# Patient Record
Sex: MALE | Race: WHITE | NOT HISPANIC OR LATINO
[De-identification: names, ages, dates, MRNs, and addresses within clinical notes are randomized per-mention and may not be internally consistent; named-entity substitution may affect disease eponyms.]

---

## 2021-09-05 DIAGNOSIS — M25.50 PAIN IN UNSPECIFIED JOINT: ICD-10-CM

## 2021-09-05 DIAGNOSIS — Z80.8 FAMILY HISTORY OF MALIGNANT NEOPLASM OF OTHER ORGANS OR SYSTEMS: ICD-10-CM

## 2021-09-05 DIAGNOSIS — Z82.61 FAMILY HISTORY OF ARTHRITIS: ICD-10-CM

## 2021-09-10 ENCOUNTER — NON-APPOINTMENT (OUTPATIENT)
Age: 51
End: 2021-09-10

## 2021-09-10 ENCOUNTER — APPOINTMENT (OUTPATIENT)
Dept: INTERNAL MEDICINE | Facility: CLINIC | Age: 51
End: 2021-09-10
Payer: COMMERCIAL

## 2021-09-10 VITALS
WEIGHT: 265 LBS | SYSTOLIC BLOOD PRESSURE: 136 MMHG | DIASTOLIC BLOOD PRESSURE: 82 MMHG | RESPIRATION RATE: 17 BRPM | TEMPERATURE: 97.9 F | OXYGEN SATURATION: 95 % | HEART RATE: 104 BPM | BODY MASS INDEX: 37.1 KG/M2 | HEIGHT: 71 IN

## 2021-09-10 DIAGNOSIS — R79.89 OTHER SPECIFIED ABNORMAL FINDINGS OF BLOOD CHEMISTRY: ICD-10-CM

## 2021-09-10 PROCEDURE — 99214 OFFICE O/P EST MOD 30 MIN: CPT | Mod: 25

## 2021-09-14 PROBLEM — R79.89 LOW VITAMIN D LEVEL: Status: ACTIVE | Noted: 2021-09-05

## 2021-09-15 LAB
25(OH)D3 SERPL-MCNC: 30.4 NG/ML
BASOPHILS # BLD AUTO: 0.08 K/UL
BASOPHILS NFR BLD AUTO: 1.3 %
EOSINOPHIL # BLD AUTO: 0.17 K/UL
EOSINOPHIL NFR BLD AUTO: 2.8 %
ESTIMATED AVERAGE GLUCOSE: 120 MG/DL
HBA1C MFR BLD HPLC: 5.8 %
HCT VFR BLD CALC: 51 %
HGB BLD-MCNC: 15.7 G/DL
IMM GRANULOCYTES NFR BLD AUTO: 0.2 %
LYMPHOCYTES # BLD AUTO: 1.43 K/UL
LYMPHOCYTES NFR BLD AUTO: 23.3 %
MAN DIFF?: NORMAL
MCHC RBC-ENTMCNC: 28.5 PG
MCHC RBC-ENTMCNC: 30.8 GM/DL
MCV RBC AUTO: 92.6 FL
MONOCYTES # BLD AUTO: 0.43 K/UL
MONOCYTES NFR BLD AUTO: 7 %
NEUTROPHILS # BLD AUTO: 4.03 K/UL
NEUTROPHILS NFR BLD AUTO: 65.4 %
PLATELET # BLD AUTO: 314 K/UL
RBC # BLD: 5.51 M/UL
RBC # FLD: 13.6 %
WBC # FLD AUTO: 6.15 K/UL

## 2021-09-15 NOTE — HISTORY OF PRESENT ILLNESS
[FreeTextEntry1] : follow up [de-identified] : 50 y/o male presents for follow up. He states he had covid vaccine a few weeks ago. He is  concerned about a few labs tests and is  requesting to repeat his CBC, Vit D and A1C. Patient also states he is going for a colonoscopy next month due to FH of father with colon cancer. He feels otherwise well and offers no other acute complaints or concerns.

## 2021-10-18 ENCOUNTER — APPOINTMENT (OUTPATIENT)
Dept: DISASTER EMERGENCY | Facility: CLINIC | Age: 51
End: 2021-10-18

## 2021-10-18 DIAGNOSIS — Z01.818 ENCOUNTER FOR OTHER PREPROCEDURAL EXAMINATION: ICD-10-CM

## 2021-10-19 LAB — SARS-COV-2 N GENE NPH QL NAA+PROBE: NOT DETECTED

## 2021-10-21 ENCOUNTER — APPOINTMENT (OUTPATIENT)
Dept: SURGERY | Facility: CLINIC | Age: 51
End: 2021-10-21
Payer: COMMERCIAL

## 2021-10-21 PROCEDURE — 45380 COLONOSCOPY AND BIOPSY: CPT

## 2021-11-04 ENCOUNTER — TRANSCRIPTION ENCOUNTER (OUTPATIENT)
Age: 51
End: 2021-11-04

## 2021-11-04 LAB — CORE LAB BIOPSY: NORMAL

## 2021-12-29 ENCOUNTER — APPOINTMENT (OUTPATIENT)
Dept: INTERNAL MEDICINE | Facility: CLINIC | Age: 51
End: 2021-12-29
Payer: COMMERCIAL

## 2021-12-29 DIAGNOSIS — U07.1 COVID-19: ICD-10-CM

## 2021-12-29 PROCEDURE — 87804 INFLUENZA ASSAY W/OPTIC: CPT | Mod: 59,QW

## 2021-12-29 PROCEDURE — 99213 OFFICE O/P EST LOW 20 MIN: CPT | Mod: 25

## 2021-12-30 LAB
FLUAV SPEC QL CULT: NEGATIVE
FLUBV AG SPEC QL IA: NEGATIVE

## 2021-12-31 PROBLEM — U07.1 COVID-19: Status: ACTIVE | Noted: 2021-12-29

## 2021-12-31 NOTE — HISTORY OF PRESENT ILLNESS
[Patient presents to the office today for COVID-19 evaluation and testing.] : Patient presents to the office today for COVID-19 evaluation and testing. [] : no dizziness on standing [None Known] : none known [FreeTextEntry1] : Patient developed chills and cough and nasal congestion.  Denies shortness of breath [Lung Disease] : lung disease [None] : none [Clear] : clear [Normal O2 sat at rest] : normal O2 sat at rest [Grossly normal, interacts, not tired or weak] : grossly normal, interacts, not tired or weak [COVID-19 testing ordered and specimen obtained] : COVID-19 testing ordered and specimen obtained

## 2022-01-03 ENCOUNTER — NON-APPOINTMENT (OUTPATIENT)
Age: 52
End: 2022-01-03

## 2022-01-03 LAB
RAPID RVP RESULT: DETECTED
SARS-COV-2 RNA PNL RESP NAA+PROBE: DETECTED

## 2022-01-03 NOTE — HISTORY OF PRESENT ILLNESS
[Home] : at home, [unfilled] , at the time of the visit. [Medical Office: ([facility])___] : at the medical office located in  [Verbal consent obtained from patient] : the patient, [unfilled] [FreeTextEntry1] : informed covid +// asymptomatic

## 2022-01-04 ENCOUNTER — TRANSCRIPTION ENCOUNTER (OUTPATIENT)
Age: 52
End: 2022-01-04

## 2022-07-30 ENCOUNTER — APPOINTMENT (OUTPATIENT)
Dept: INTERNAL MEDICINE | Facility: CLINIC | Age: 52
End: 2022-07-30

## 2022-07-30 VITALS
BODY MASS INDEX: 37.1 KG/M2 | TEMPERATURE: 97.8 F | OXYGEN SATURATION: 96 % | HEART RATE: 63 BPM | WEIGHT: 265 LBS | RESPIRATION RATE: 17 BRPM | SYSTOLIC BLOOD PRESSURE: 130 MMHG | HEIGHT: 71 IN | DIASTOLIC BLOOD PRESSURE: 80 MMHG

## 2022-07-30 DIAGNOSIS — Z86.39 PERSONAL HISTORY OF OTHER ENDOCRINE, NUTRITIONAL AND METABOLIC DISEASE: ICD-10-CM

## 2022-07-30 DIAGNOSIS — Z87.09 PERSONAL HISTORY OF OTHER DISEASES OF THE RESPIRATORY SYSTEM: ICD-10-CM

## 2022-07-30 DIAGNOSIS — M25.569 PAIN IN UNSPECIFIED KNEE: ICD-10-CM

## 2022-07-30 DIAGNOSIS — Z91.09 OTHER ALLERGY STATUS, OTHER THAN TO DRUGS AND BIOLOGICAL SUBSTANCES: ICD-10-CM

## 2022-07-30 LAB
GLUCOSE BLDC GLUCOMTR-MCNC: 101
HBA1C MFR BLD HPLC: 5.6

## 2022-07-30 PROCEDURE — 82962 GLUCOSE BLOOD TEST: CPT

## 2022-07-30 PROCEDURE — 99214 OFFICE O/P EST MOD 30 MIN: CPT | Mod: 25

## 2022-07-30 PROCEDURE — 36415 COLL VENOUS BLD VENIPUNCTURE: CPT

## 2022-07-30 PROCEDURE — 83036 HEMOGLOBIN GLYCOSYLATED A1C: CPT | Mod: QW

## 2022-07-30 PROCEDURE — G0447 BEHAVIOR COUNSEL OBESITY 15M: CPT

## 2022-07-30 RX ORDER — AZITHROMYCIN 250 MG/1
250 TABLET, FILM COATED ORAL
Qty: 1 | Refills: 0 | Status: DISCONTINUED | COMMUNITY
Start: 2021-12-29 | End: 2022-07-30

## 2022-07-30 RX ORDER — ALBUTEROL SULFATE 2.5 MG/3ML
(2.5 MG/3ML) SOLUTION RESPIRATORY (INHALATION)
Refills: 0 | Status: DISCONTINUED | COMMUNITY
End: 2022-07-30

## 2022-07-30 RX ORDER — MOMETASONE FUROATE MONOHYDRATE 50 UG/1
50 SPRAY, METERED NASAL
Refills: 0 | Status: DISCONTINUED | COMMUNITY
End: 2022-07-30

## 2022-07-30 RX ORDER — DIAZEPAM 5 MG/1
TABLET ORAL
Refills: 0 | Status: DISCONTINUED | COMMUNITY
End: 2022-07-30

## 2022-07-30 RX ORDER — GUAIFENESIN 100 MG/5ML
100 SOLUTION ORAL 3 TIMES DAILY
Qty: 250 | Refills: 0 | Status: DISCONTINUED | COMMUNITY
Start: 2022-01-04 | End: 2022-07-30

## 2022-07-30 RX ORDER — NAPROXEN 500 MG/1
500 TABLET ORAL
Qty: 60 | Refills: 0 | Status: DISCONTINUED | COMMUNITY
Start: 2022-02-08

## 2022-07-30 RX ORDER — ZOLPIDEM TARTRATE 5 MG/1
TABLET, FILM COATED ORAL
Refills: 0 | Status: DISCONTINUED | COMMUNITY
End: 2022-07-30

## 2022-07-30 NOTE — PHYSICAL EXAM
[Normal] : no acute distress, well nourished, well developed and well-appearing [No Respiratory Distress] : no respiratory distress  [Clear to Auscultation] : lungs were clear to auscultation bilaterally [Normal Rate] : normal rate  [Regular Rhythm] : with a regular rhythm [Normal S1, S2] : normal S1 and S2 [No Edema] : there was no peripheral edema [Soft] : abdomen soft [Non Tender] : non-tender [Non-distended] : non-distended [Normal Bowel Sounds] : normal bowel sounds

## 2022-07-30 NOTE — COUNSELING
[Potential consequences of obesity discussed] : Potential consequences of obesity discussed [Benefits of weight loss discussed] : Benefits of weight loss discussed [Encouraged to increase physical activity] : Encouraged to increase physical activity [Encouraged to maintain food diary] : Encouraged to maintain food diary [FreeTextEntry4] : 10

## 2022-07-30 NOTE — HISTORY OF PRESENT ILLNESS
[FreeTextEntry1] : Follow-up [de-identified] : Patient presents for follow-up for close intolerance hyperlipidemia obesity situational anxiety insomnia GERD.  Patient is reporting having difficult time losing weight.  Has had good diet and trying to exercise however was not able to lose weight.  Patient states that he has been having some knee Pain which prevents him from exercising regularly

## 2022-08-04 ENCOUNTER — NON-APPOINTMENT (OUTPATIENT)
Age: 52
End: 2022-08-04

## 2022-08-04 LAB
ALBUMIN SERPL ELPH-MCNC: 4.7 G/DL
ALP BLD-CCNC: 71 U/L
ALT SERPL-CCNC: 33 U/L
AMPHET UR-MCNC: NEGATIVE
ANION GAP SERPL CALC-SCNC: 14 MMOL/L
AST SERPL-CCNC: 21 U/L
BARBITURATES UR-MCNC: NEGATIVE
BASOPHILS # BLD AUTO: 0.07 K/UL
BASOPHILS NFR BLD AUTO: 1.4 %
BENZODIAZ UR-MCNC: NEGATIVE
BILIRUB SERPL-MCNC: 0.2 MG/DL
BUN SERPL-MCNC: 11 MG/DL
CALCIUM SERPL-MCNC: 9.7 MG/DL
CHLORIDE SERPL-SCNC: 106 MMOL/L
CHOLEST SERPL-MCNC: 228 MG/DL
CO2 SERPL-SCNC: 24 MMOL/L
COCAINE METAB.OTHER UR-MCNC: NEGATIVE
CREAT SERPL-MCNC: 0.77 MG/DL
CREAT SPEC-SCNC: 169 MG/DL
CREATININE, URINE: 172.1 MG/DL
EGFR: 108 ML/MIN/1.73M2
EOSINOPHIL # BLD AUTO: 0.14 K/UL
EOSINOPHIL NFR BLD AUTO: 2.8 %
GLUCOSE SERPL-MCNC: 105 MG/DL
HCT VFR BLD CALC: 49.7 %
HDLC SERPL-MCNC: 61 MG/DL
HGB BLD-MCNC: 14.5 G/DL
IMM GRANULOCYTES NFR BLD AUTO: 0.2 %
LDLC SERPL CALC-MCNC: 147 MG/DL
LYMPHOCYTES # BLD AUTO: 1.3 K/UL
LYMPHOCYTES NFR BLD AUTO: 26.2 %
MAN DIFF?: NORMAL
MCHC RBC-ENTMCNC: 26.7 PG
MCHC RBC-ENTMCNC: 29.2 GM/DL
MCV RBC AUTO: 91.5 FL
METHADONE UR-MCNC: NEGATIVE
METHAQUALONE UR-MCNC: NEGATIVE
MICROALBUMIN 24H UR DL<=1MG/L-MCNC: <1.2 MG/DL
MICROALBUMIN/CREAT 24H UR-RTO: NORMAL MG/G
MONOCYTES # BLD AUTO: 0.43 K/UL
MONOCYTES NFR BLD AUTO: 8.7 %
NEUTROPHILS # BLD AUTO: 3.02 K/UL
NEUTROPHILS NFR BLD AUTO: 60.7 %
NONHDLC SERPL-MCNC: 167 MG/DL
OPIATES UR-MCNC: NEGATIVE
PCP UR-MCNC: NEGATIVE
PLATELET # BLD AUTO: 301 K/UL
POTASSIUM SERPL-SCNC: 4.8 MMOL/L
PROPOXYPH UR QL: NEGATIVE
PROT SERPL-MCNC: 7.6 G/DL
RBC # BLD: 5.43 M/UL
RBC # FLD: 14 %
SODIUM SERPL-SCNC: 144 MMOL/L
THC UR QL: NEGATIVE
TRIGL SERPL-MCNC: 101 MG/DL
WBC # FLD AUTO: 4.97 K/UL

## 2022-12-15 ENCOUNTER — TRANSCRIPTION ENCOUNTER (OUTPATIENT)
Age: 52
End: 2022-12-15

## 2022-12-17 ENCOUNTER — APPOINTMENT (OUTPATIENT)
Dept: INTERNAL MEDICINE | Facility: CLINIC | Age: 52
End: 2022-12-17

## 2022-12-17 ENCOUNTER — NON-APPOINTMENT (OUTPATIENT)
Age: 52
End: 2022-12-17

## 2022-12-17 VITALS
SYSTOLIC BLOOD PRESSURE: 132 MMHG | WEIGHT: 270 LBS | OXYGEN SATURATION: 97 % | BODY MASS INDEX: 37.8 KG/M2 | DIASTOLIC BLOOD PRESSURE: 84 MMHG | HEIGHT: 71 IN | RESPIRATION RATE: 17 BRPM | HEART RATE: 92 BPM | TEMPERATURE: 97.7 F

## 2022-12-17 DIAGNOSIS — Z00.00 ENCOUNTER FOR GENERAL ADULT MEDICAL EXAMINATION W/OUT ABNORMAL FINDINGS: ICD-10-CM

## 2022-12-17 DIAGNOSIS — G47.00 INSOMNIA, UNSPECIFIED: ICD-10-CM

## 2022-12-17 PROCEDURE — 99214 OFFICE O/P EST MOD 30 MIN: CPT | Mod: 25

## 2022-12-17 PROCEDURE — 93000 ELECTROCARDIOGRAM COMPLETE: CPT | Mod: 59

## 2022-12-17 PROCEDURE — G0444 DEPRESSION SCREEN ANNUAL: CPT | Mod: 59

## 2022-12-17 PROCEDURE — 99396 PREV VISIT EST AGE 40-64: CPT | Mod: 25

## 2022-12-17 RX ORDER — SEMAGLUTIDE 1.34 MG/ML
2 INJECTION, SOLUTION SUBCUTANEOUS
Qty: 1 | Refills: 5 | Status: DISCONTINUED | COMMUNITY
Start: 2022-07-30 | End: 2022-12-17

## 2022-12-17 RX ORDER — DIAZEPAM 2 MG/1
2 TABLET ORAL
Qty: 5 | Refills: 0 | Status: DISCONTINUED | COMMUNITY
Start: 2022-07-30 | End: 2022-12-17

## 2022-12-17 RX ORDER — ZOLPIDEM TARTRATE 10 MG/1
10 TABLET ORAL
Qty: 10 | Refills: 0 | Status: DISCONTINUED | COMMUNITY
Start: 2022-07-30 | End: 2022-12-17

## 2022-12-17 RX ORDER — METFORMIN HYDROCHLORIDE 500 MG/1
500 TABLET, COATED ORAL
Qty: 180 | Refills: 0 | Status: DISCONTINUED | COMMUNITY
Start: 2022-08-04 | End: 2022-12-17

## 2022-12-17 NOTE — HEALTH RISK ASSESSMENT
[0] : 2) Feeling down, depressed, or hopeless: Not at all (0) [Patient reported colonoscopy was normal] : Patient reported colonoscopy was normal [[id number]] : 0 [ColonoscopyDate] : 2021

## 2022-12-17 NOTE — PHYSICAL EXAM
[No Acute Distress] : no acute distress [Well Nourished] : well nourished [Well Developed] : well developed [Well-Appearing] : well-appearing [Normal Sclera/Conjunctiva] : normal sclera/conjunctiva [PERRL] : pupils equal round and reactive to light [EOMI] : extraocular movements intact [Normal Outer Ear/Nose] : the outer ears and nose were normal in appearance [Normal Oropharynx] : the oropharynx was normal [No JVD] : no jugular venous distention [No Lymphadenopathy] : no lymphadenopathy [Supple] : supple [Thyroid Normal, No Nodules] : the thyroid was normal and there were no nodules present [No Respiratory Distress] : no respiratory distress  [No Accessory Muscle Use] : no accessory muscle use [Clear to Auscultation] : lungs were clear to auscultation bilaterally [Normal Rate] : normal rate  [Regular Rhythm] : with a regular rhythm [Normal S1, S2] : normal S1 and S2 [No Carotid Bruits] : no carotid bruits [No Abdominal Bruit] : a ~M bruit was not heard ~T in the abdomen [No Varicosities] : no varicosities [Pedal Pulses Present] : the pedal pulses are present [No Edema] : there was no peripheral edema [No Palpable Aorta] : no palpable aorta [No Extremity Clubbing/Cyanosis] : no extremity clubbing/cyanosis [Soft] : abdomen soft [Non Tender] : non-tender [Non-distended] : non-distended [No Masses] : no abdominal mass palpated [No HSM] : no HSM [Normal Bowel Sounds] : normal bowel sounds [Normal Posterior Cervical Nodes] : no posterior cervical lymphadenopathy [Normal Anterior Cervical Nodes] : no anterior cervical lymphadenopathy [No CVA Tenderness] : no CVA  tenderness [No Spinal Tenderness] : no spinal tenderness [No Joint Swelling] : no joint swelling [Grossly Normal Strength/Tone] : grossly normal strength/tone [No Rash] : no rash [Coordination Grossly Intact] : coordination grossly intact [No Focal Deficits] : no focal deficits [Normal Gait] : normal gait [Deep Tendon Reflexes (DTR)] : deep tendon reflexes were 2+ and symmetric [Normal Affect] : the affect was normal [Normal Insight/Judgement] : insight and judgment were intact [de-identified] : Murmur

## 2022-12-17 NOTE — HISTORY OF PRESENT ILLNESS
[de-identified] : Patient presents for annual physical.\par Patient still struggling with weight Ozempic was not approved.\par Patient complaining intermittent rectal bleeding has history of hemorrhoids.  Plan to follow-up with GI to discuss treatment options.\par Patient is complaining of right upper quadrant pain moderate intermittent no direct association with food activities or bowel movements.  Denies nausea vomiting constipation diarrhea fever weight loss

## 2022-12-17 NOTE — ASSESSMENT
[FreeTextEntry1] : Annual physical routine preventative care and immunizations discussed with the patient healthy lifestyle diet and exercises PSA colonoscopy blood work checked EKG discussed with the patient\par Anxiety well controlled we will continue with diazepam 5 mg as needed I stop checked no evidence of abuse or misuse.  Rx refilled drug screen checked\par Elevated cholesterol check fasting lipids intervention pending results\par GERD stable continue current regiment\par Glucose intolerance check hemoglobin A1c intervention pending results\par BS stable\par Insomnia stable\par Obesity weight loss discussed with the patient\par Right upper quadrant abdominal pain check abdominal ultrasound rule out gallbladder stones hepatobiliary pathology check labs\par Congenital aortic stenosis advised to follow-up with cardiology\par Follow-up pending results\par

## 2022-12-22 ENCOUNTER — NON-APPOINTMENT (OUTPATIENT)
Age: 52
End: 2022-12-22

## 2022-12-22 LAB
25(OH)D3 SERPL-MCNC: 48 NG/ML
ALBUMIN SERPL ELPH-MCNC: 4.4 G/DL
ALP BLD-CCNC: 70 U/L
ALT SERPL-CCNC: 31 U/L
ANION GAP SERPL CALC-SCNC: 12 MMOL/L
APPEARANCE: CLEAR
AST SERPL-CCNC: 19 U/L
BASOPHILS # BLD AUTO: 0.08 K/UL
BASOPHILS NFR BLD AUTO: 1.4 %
BILIRUB SERPL-MCNC: 0.3 MG/DL
BILIRUBIN URINE: NEGATIVE
BLOOD URINE: NEGATIVE
BUN SERPL-MCNC: 11 MG/DL
CALCIUM SERPL-MCNC: 9.4 MG/DL
CHLORIDE SERPL-SCNC: 103 MMOL/L
CHOLEST SERPL-MCNC: 195 MG/DL
CO2 SERPL-SCNC: 26 MMOL/L
COLOR: NORMAL
CREAT SERPL-MCNC: 0.84 MG/DL
EGFR: 105 ML/MIN/1.73M2
EOSINOPHIL # BLD AUTO: 0.16 K/UL
EOSINOPHIL NFR BLD AUTO: 2.7 %
ESTIMATED AVERAGE GLUCOSE: 128 MG/DL
FOLATE SERPL-MCNC: 12 NG/ML
GLUCOSE QUALITATIVE U: NEGATIVE
GLUCOSE SERPL-MCNC: 90 MG/DL
HBA1C MFR BLD HPLC: 6.1 %
HCT VFR BLD CALC: 42.3 %
HDLC SERPL-MCNC: 51 MG/DL
HGB BLD-MCNC: 13 G/DL
IMM GRANULOCYTES NFR BLD AUTO: 0.2 %
KETONES URINE: NEGATIVE
LDLC SERPL CALC-MCNC: 114 MG/DL
LEUKOCYTE ESTERASE URINE: NEGATIVE
LYMPHOCYTES # BLD AUTO: 1.55 K/UL
LYMPHOCYTES NFR BLD AUTO: 26.4 %
MAN DIFF?: NORMAL
MCHC RBC-ENTMCNC: 25 PG
MCHC RBC-ENTMCNC: 30.7 GM/DL
MCV RBC AUTO: 81.2 FL
MONOCYTES # BLD AUTO: 0.46 K/UL
MONOCYTES NFR BLD AUTO: 7.8 %
NEUTROPHILS # BLD AUTO: 3.62 K/UL
NEUTROPHILS NFR BLD AUTO: 61.5 %
NITRITE URINE: NEGATIVE
NONHDLC SERPL-MCNC: 144 MG/DL
PH URINE: 5.5
PLATELET # BLD AUTO: 341 K/UL
POTASSIUM SERPL-SCNC: 4.9 MMOL/L
PROT SERPL-MCNC: 7.3 G/DL
PROTEIN URINE: NEGATIVE
PSA FREE FLD-MCNC: 50 %
PSA FREE SERPL-MCNC: 0.19 NG/ML
PSA SERPL-MCNC: 0.38 NG/ML
RBC # BLD: 5.21 M/UL
RBC # FLD: 14.3 %
SODIUM SERPL-SCNC: 140 MMOL/L
SPECIFIC GRAVITY URINE: 1.01
TRIGL SERPL-MCNC: 147 MG/DL
TSH SERPL-ACNC: 1.22 UIU/ML
UROBILINOGEN URINE: NORMAL
VIT B12 SERPL-MCNC: 534 PG/ML
WBC # FLD AUTO: 5.88 K/UL

## 2023-01-20 ENCOUNTER — NON-APPOINTMENT (OUTPATIENT)
Age: 53
End: 2023-01-20

## 2023-01-20 ENCOUNTER — APPOINTMENT (OUTPATIENT)
Dept: CARDIOLOGY | Facility: CLINIC | Age: 53
End: 2023-01-20
Payer: COMMERCIAL

## 2023-01-20 VITALS
OXYGEN SATURATION: 98 % | HEART RATE: 76 BPM | WEIGHT: 270 LBS | BODY MASS INDEX: 37.8 KG/M2 | DIASTOLIC BLOOD PRESSURE: 84 MMHG | HEIGHT: 71 IN | SYSTOLIC BLOOD PRESSURE: 134 MMHG

## 2023-01-20 PROCEDURE — 93000 ELECTROCARDIOGRAM COMPLETE: CPT

## 2023-01-20 PROCEDURE — 99204 OFFICE O/P NEW MOD 45 MIN: CPT | Mod: 25

## 2023-01-27 RX ORDER — FAMOTIDINE 20 MG/1
20 TABLET, FILM COATED ORAL
Refills: 0 | Status: ACTIVE | COMMUNITY

## 2023-02-02 ENCOUNTER — APPOINTMENT (OUTPATIENT)
Dept: SURGERY | Facility: CLINIC | Age: 53
End: 2023-02-02
Payer: COMMERCIAL

## 2023-02-02 VITALS
BODY MASS INDEX: 39.9 KG/M2 | WEIGHT: 285 LBS | DIASTOLIC BLOOD PRESSURE: 84 MMHG | SYSTOLIC BLOOD PRESSURE: 131 MMHG | HEART RATE: 95 BPM | TEMPERATURE: 96.2 F | OXYGEN SATURATION: 98 % | HEIGHT: 71 IN

## 2023-02-02 PROCEDURE — 99214 OFFICE O/P EST MOD 30 MIN: CPT | Mod: 25

## 2023-02-02 PROCEDURE — 46221 LIGATION OF HEMORRHOID(S): CPT

## 2023-02-04 NOTE — HISTORY OF PRESENT ILLNESS
[FreeTextEntry1] : Mr. DAVIS WILEY  is a 52 year M with a history of anxiety, GERD, obesity, IBS, heart murmur, iron deficiency (hx of transfusion), presenting with anal bleeding here for a follow up visit. Currently he has occasional anal bleeding sometimes with wiping and in the toilet. He also notices anal irritation when the hemorrhoids prolapse which resolve when they spontaneously reduce. He did banding around 2014 during his colonoscopy which was very painful. He's on daily Metamucil.  I performed his last colonoscopy in 10/2021 which revealed moderate to large internal hemorrhoids. He is not on a blood thinner. Patient reports faint at work on 1/25/2023 and was taken to [facility]. He is iron deficient anemic and was told to have that worked up. He reports having gained 15 lbs since last year.\par \par \par \par

## 2023-02-04 NOTE — PROCEDURE
[FreeTextEntry1] : Anoscopy w/ rubber band ligation - performed with small lighted disposable anoscope, RP and LP internal hemorrhoids, a band was placed RP without any bleeding at the end, pt tolerated the procedure.

## 2023-02-04 NOTE — ASSESSMENT
[FreeTextEntry1] : Mr. DAVIS WILEY  is a 52 year M presenting with anal bleeding most likely due to internal hemorrhoids.

## 2023-02-04 NOTE — REVIEW OF SYSTEMS
[As Noted in HPI] : as noted in HPI [Arthralgias] : arthralgias [Negative] : Neurological [Recent Weight Gain (___ Lbs)] : recent [unfilled] ~Ulb weight gain [Palpitations] : palpitations [Fever] : no fever [Chills] : no chills [Feeling Poorly] : not feeling poorly [Eye Pain] : no eye pain [Earache] : no earache [Chest Pain] : no chest pain [Shortness Of Breath] : no shortness of breath [Abdominal Pain] : no abdominal pain [Vomiting] : no vomiting [Constipation] : no constipation [Diarrhea] : no diarrhea [Dysuria] : no dysuria [FreeTextEntry2] : weakness r/t iron deficiency  [FreeTextEntry5] : occasional palpitations [FreeTextEntry7] : anal bleeding and irritation

## 2023-02-04 NOTE — PHYSICAL EXAM
[Normal Rate and Rhythm] : normal rate and rhythm [Alert] : alert [Oriented to Person] : oriented to person [Oriented to Place] : oriented to place [Oriented to Time] : oriented to time [Normal] : was normal [None] : there was no rectal abscess [Excoriation] : no perianal excoriation [Tender, Swollen] : nontender, non-swollen [Thrombosed] : that was not thrombosed [Gross Blood] : no gross blood [JVD] : no jugular venous distention  [de-identified] : soft, obese non-distended, non-tender to palpation.  [de-identified] : RA and RL external hemorrhoids that connect internally. No pain on MONA. Internal Exam below.  [de-identified] : RA and RL external hemorrhoids that connect internally [de-identified] : normocephalic, atraumatic, EOMI, normal conjunctiva  [de-identified] : awake, alert, NAD  [de-identified] : b/l chest rise, EWOB on RA  [de-identified] : Normal strength  [de-identified] : deferred [de-identified] : Perianal as above  [de-identified] : normal mood and affect

## 2023-02-11 ENCOUNTER — APPOINTMENT (OUTPATIENT)
Dept: INTERNAL MEDICINE | Facility: CLINIC | Age: 53
End: 2023-02-11
Payer: COMMERCIAL

## 2023-02-11 VITALS
HEART RATE: 80 BPM | RESPIRATION RATE: 17 BRPM | TEMPERATURE: 97.5 F | DIASTOLIC BLOOD PRESSURE: 70 MMHG | HEIGHT: 71 IN | WEIGHT: 285 LBS | SYSTOLIC BLOOD PRESSURE: 110 MMHG | BODY MASS INDEX: 39.9 KG/M2 | OXYGEN SATURATION: 98 %

## 2023-02-11 DIAGNOSIS — K64.9 UNSPECIFIED HEMORRHOIDS: ICD-10-CM

## 2023-02-11 DIAGNOSIS — Q23.0 CONGENITAL STENOSIS OF AORTIC VALVE: ICD-10-CM

## 2023-02-11 PROCEDURE — 99214 OFFICE O/P EST MOD 30 MIN: CPT | Mod: 25

## 2023-02-11 NOTE — PHYSICAL EXAM
[Normal Rate] : normal rate  [Regular Rhythm] : with a regular rhythm [Normal S1, S2] : normal S1 and S2 [No Edema] : there was no peripheral edema [Normal] : affect was normal and insight and judgment were intact

## 2023-02-11 NOTE — ASSESSMENT
[FreeTextEntry1] : Dizziness presyncope Will obtain CT head without contrast to rule out intracranial pathology\par Possibly related to iron deficiency\par Anxiety stable continue current management\par Congenital aortic stenosis had unremarkable echo awaiting stress test\par Hemorrhoids stable currently undergoing banding procedure\par Iron deficiency check labs continue iron\par Follow-up pending results\par

## 2023-02-11 NOTE — HISTORY OF PRESENT ILLNESS
[FreeTextEntry1] : Follow-up status post recent hospital discharge [de-identified] : Patient presents status post [facility] discharge 1/26 for dizziness.  Cardiac work-up is ongoing scheduled for stress test.  Echo was unremarkable with no wall abnormality found.  Patient is reporting that he has been feeling dizzy and presyncopal sensation.  Intermittent not related to activity or food intake bowel movements.  In the hospital was found to have low iron and was started on iron.  Patient is reporting that he has been struggling with hemorrhoids for a while and has lost blood due to that currently getting banding for hemorrhoids with GI

## 2023-02-16 ENCOUNTER — NON-APPOINTMENT (OUTPATIENT)
Age: 53
End: 2023-02-16

## 2023-02-16 LAB
ALBUMIN SERPL ELPH-MCNC: 4.6 G/DL
ALP BLD-CCNC: 63 U/L
ALT SERPL-CCNC: 35 U/L
ANION GAP SERPL CALC-SCNC: 11 MMOL/L
AST SERPL-CCNC: 21 U/L
BASOPHILS # BLD AUTO: 0.07 K/UL
BASOPHILS NFR BLD AUTO: 1.1 %
BILIRUB SERPL-MCNC: 0.4 MG/DL
BUN SERPL-MCNC: 12 MG/DL
CALCIUM SERPL-MCNC: 9.9 MG/DL
CHLORIDE SERPL-SCNC: 104 MMOL/L
CO2 SERPL-SCNC: 25 MMOL/L
CREAT SERPL-MCNC: 0.85 MG/DL
EGFR: 105 ML/MIN/1.73M2
EOSINOPHIL # BLD AUTO: 0.12 K/UL
EOSINOPHIL NFR BLD AUTO: 1.8 %
FERRITIN SERPL-MCNC: 42 NG/ML
GLUCOSE SERPL-MCNC: 97 MG/DL
HCT VFR BLD CALC: 44.9 %
HGB BLD-MCNC: 13.5 G/DL
IMM GRANULOCYTES NFR BLD AUTO: 0.3 %
IRON SERPL-MCNC: 40 UG/DL
LYMPHOCYTES # BLD AUTO: 1.65 K/UL
LYMPHOCYTES NFR BLD AUTO: 24.9 %
MAN DIFF?: NORMAL
MCHC RBC-ENTMCNC: 24.1 PG
MCHC RBC-ENTMCNC: 30.1 GM/DL
MCV RBC AUTO: 80.2 FL
MONOCYTES # BLD AUTO: 0.52 K/UL
MONOCYTES NFR BLD AUTO: 7.9 %
NEUTROPHILS # BLD AUTO: 4.24 K/UL
NEUTROPHILS NFR BLD AUTO: 64 %
PLATELET # BLD AUTO: 319 K/UL
POTASSIUM SERPL-SCNC: 5.4 MMOL/L
PROT SERPL-MCNC: 7.3 G/DL
RBC # BLD: 5.6 M/UL
RBC # FLD: 17.3 %
SODIUM SERPL-SCNC: 140 MMOL/L
WBC # FLD AUTO: 6.62 K/UL

## 2023-02-18 ENCOUNTER — APPOINTMENT (OUTPATIENT)
Dept: INTERNAL MEDICINE | Facility: CLINIC | Age: 53
End: 2023-02-18
Payer: COMMERCIAL

## 2023-02-18 ENCOUNTER — NON-APPOINTMENT (OUTPATIENT)
Age: 53
End: 2023-02-18

## 2023-02-18 VITALS
HEIGHT: 71 IN | DIASTOLIC BLOOD PRESSURE: 80 MMHG | OXYGEN SATURATION: 97 % | TEMPERATURE: 97.2 F | SYSTOLIC BLOOD PRESSURE: 130 MMHG | HEART RATE: 79 BPM | BODY MASS INDEX: 39.9 KG/M2 | RESPIRATION RATE: 17 BRPM | WEIGHT: 285 LBS

## 2023-02-18 DIAGNOSIS — H53.8 OTHER VISUAL DISTURBANCES: ICD-10-CM

## 2023-02-18 DIAGNOSIS — E87.5 HYPERKALEMIA: ICD-10-CM

## 2023-02-18 DIAGNOSIS — R42 DIZZINESS AND GIDDINESS: ICD-10-CM

## 2023-02-18 DIAGNOSIS — R55 DIZZINESS AND GIDDINESS: ICD-10-CM

## 2023-02-18 PROCEDURE — 93000 ELECTROCARDIOGRAM COMPLETE: CPT

## 2023-02-18 PROCEDURE — 99213 OFFICE O/P EST LOW 20 MIN: CPT | Mod: 25

## 2023-02-18 NOTE — PHYSICAL EXAM
[No Respiratory Distress] : no respiratory distress  [No Edema] : there was no peripheral edema [Normal] : normal gait, coordination grossly intact, no focal deficits and deep tendon reflexes were 2+ and symmetric [de-identified] : Murmur

## 2023-02-18 NOTE — HISTORY OF PRESENT ILLNESS
[FreeTextEntry1] : Follow-up [de-identified] : Patient will follow up test results\par Hyperkalemia patient denies chest pain shortness of breath fatigue palpitations muscle pain.\par Iron deficiency 40.  Patient had bleeding hemorrhoids for which she received banding procedure with GI currently better taking iron\par Patient continues to experience intermittent dizziness presyncopal episodes.  Associated blurry vision with episodes for the last few month moderate intermittent.  No associated chest pain shortness of breath

## 2023-02-18 NOTE — ASSESSMENT
[FreeTextEntry1] : Hypokalemia repeat potassium.  Unclear etiology likely lab error versus diet.  EKG discussed with the patient unremarkable\par Obesity will get for authorization Wegovy weight loss discussed with the patient\par Dizziness presyncope blurry vision condition deteriorating unclear etiology need to rule out intracranial pathology.  We will appeal CT head\par Follow-up pending results

## 2023-02-23 ENCOUNTER — NON-APPOINTMENT (OUTPATIENT)
Age: 53
End: 2023-02-23

## 2023-02-23 LAB — POTASSIUM SERPL-SCNC: 5 MMOL/L

## 2023-02-28 ENCOUNTER — APPOINTMENT (OUTPATIENT)
Dept: SURGERY | Facility: CLINIC | Age: 53
End: 2023-02-28
Payer: COMMERCIAL

## 2023-02-28 VITALS
TEMPERATURE: 97.2 F | DIASTOLIC BLOOD PRESSURE: 89 MMHG | BODY MASS INDEX: 39.2 KG/M2 | HEART RATE: 71 BPM | SYSTOLIC BLOOD PRESSURE: 139 MMHG | HEIGHT: 71 IN | WEIGHT: 280 LBS

## 2023-02-28 PROCEDURE — 99213 OFFICE O/P EST LOW 20 MIN: CPT | Mod: 25

## 2023-02-28 PROCEDURE — 46221 LIGATION OF HEMORRHOID(S): CPT

## 2023-02-28 NOTE — HISTORY OF PRESENT ILLNESS
[FreeTextEntry1] : Mr. DAVIS WILEY  is a 52 year M with a history of anxiety, GERD, obesity, IBS, heart murmur, iron deficiency (hx of transfusion), presenting with anal bleeding here for a follow up visit. He has occasional anal bleeding sometimes with wiping and in the toilet. He also notices anal irritation when the hemorrhoids prolapse which resolve when they spontaneously reduce. He did banding around 2014 during his colonoscopy which was very painful. He's on daily Metamucil.  I performed his last colonoscopy in 10/2021 which revealed moderate to large internal hemorrhoids. He is not on a blood thinner. Patient reports fainting at work on 1/25/2023 and was taken to [facility]. He is iron deficient anemic and was told to have that worked up. He reports having gained 15 lbs since last year. Today patient is doing well. He only had 2-3 episodes of bleeding since the last appointment, some pressure after banding but otherwise bleeding has improved. He is here for additional banding. Patient also informed me that his dad passed away 2 weeks ago.\par

## 2023-02-28 NOTE — PHYSICAL EXAM
[Normal] : was normal [None] : there was no rectal abscess [Normal Rate and Rhythm] : normal rate and rhythm [Alert] : alert [Oriented to Person] : oriented to person [Oriented to Place] : oriented to place [Oriented to Time] : oriented to time [Excoriation] : no perianal excoriation [Tender, Swollen] : nontender, non-swollen [Thrombosed] : that was not thrombosed [Gross Blood] : no gross blood [JVD] : no jugular venous distention  [de-identified] : soft, obese non-distended, non-tender to palpation.  [de-identified] : RA and RL external hemorrhoids that connect internally. No pain on MONA. Internal Exam below.  [de-identified] : RA and RL external hemorrhoids that connect internally [de-identified] : awake, alert, NAD  [de-identified] : normocephalic, atraumatic, EOMI, normal conjunctiva  [de-identified] : b/l chest rise, EWOB on RA  [de-identified] : deferred [de-identified] : Normal strength  [de-identified] : Perianal as above  [de-identified] : upset about fathers passing but otherwise normal affect

## 2023-03-15 ENCOUNTER — APPOINTMENT (OUTPATIENT)
Dept: CARDIOLOGY | Facility: CLINIC | Age: 53
End: 2023-03-15
Payer: COMMERCIAL

## 2023-03-15 PROCEDURE — A9500: CPT

## 2023-03-15 PROCEDURE — 93015 CV STRESS TEST SUPVJ I&R: CPT

## 2023-03-15 PROCEDURE — 78452 HT MUSCLE IMAGE SPECT MULT: CPT

## 2023-04-04 ENCOUNTER — APPOINTMENT (OUTPATIENT)
Dept: SURGERY | Facility: CLINIC | Age: 53
End: 2023-04-04
Payer: COMMERCIAL

## 2023-04-04 VITALS — HEART RATE: 75 BPM | SYSTOLIC BLOOD PRESSURE: 129 MMHG | HEIGHT: 71 IN | DIASTOLIC BLOOD PRESSURE: 87 MMHG

## 2023-04-04 VITALS — TEMPERATURE: 98.1 F

## 2023-04-04 PROCEDURE — 99213 OFFICE O/P EST LOW 20 MIN: CPT | Mod: 25

## 2023-04-04 PROCEDURE — 46221 LIGATION OF HEMORRHOID(S): CPT

## 2023-04-04 NOTE — PHYSICAL EXAM
[Normal] : was normal [None] : there was no rectal abscess [Normal Rate and Rhythm] : normal rate and rhythm [Alert] : alert [Oriented to Person] : oriented to person [Oriented to Place] : oriented to place [Oriented to Time] : oriented to time [Excoriation] : no perianal excoriation [Tender, Swollen] : nontender, non-swollen [Thrombosed] : that was not thrombosed [Gross Blood] : no gross blood [JVD] : no jugular venous distention  [de-identified] : soft, obese non-distended, non-tender to palpation.  [de-identified] : RA and RL external hemorrhoids that connect internally. No pain on MONA. Internal Exam below.  [de-identified] : RA and RL external hemorrhoids that connect internally [de-identified] : awake, alert, NAD  [de-identified] : normocephalic, atraumatic, EOMI, normal conjunctiva  [de-identified] : b/l chest rise, EWOB on RA  [de-identified] : deferred [de-identified] : Normal strength  [de-identified] : Perianal as above  [de-identified] : normal mood and affect

## 2023-04-04 NOTE — HISTORY OF PRESENT ILLNESS
[FreeTextEntry1] : Mr. DAVIS WILEY  is a 53 year M with a history of anxiety, GERD, obesity, IBS, heart murmur, iron deficiency (hx of transfusion), presenting with anal bleeding here for a follow up visit. He has occasional anal bleeding sometimes with wiping and in the toilet. He also notices anal irritation when the hemorrhoids prolapse which resolve when they spontaneously reduce. He did banding around 2014 during his colonoscopy which was very painful. He's on daily Metamucil.  I performed his last colonoscopy in 10/2021 which revealed moderate to large internal hemorrhoids. He is not on a blood thinner. Patient reports fainting at work on 1/25/2023 and was taken to [facility]. He is iron deficient anemic and was told to have that worked up. He reports having gained 15 lbs since last year. Today he reports that the bleeding is better with just some occasional spotting, overall bleeding has improved. He is here today for additional banding. \par

## 2023-04-04 NOTE — PROCEDURE
[FreeTextEntry1] : Anoscopy w/ rubber band ligation - performed with small lighted disposable anoscope, RP and LP internal hemorrhoids, a band was placed LP with some bleeding which stopped at the end, pt tolerated the procedure.

## 2023-04-04 NOTE — ASSESSMENT
[FreeTextEntry1] : Mr. DAVIS WILEY  is a 53 year M presenting with anal bleeding most likely due to internal hemorrhoids.

## 2023-04-08 RX ORDER — DIAZEPAM 5 MG/1
5 TABLET ORAL
Qty: 30 | Refills: 0 | Status: ACTIVE | COMMUNITY
Start: 2022-12-17 | End: 1900-01-01

## 2023-05-13 ENCOUNTER — APPOINTMENT (OUTPATIENT)
Dept: INTERNAL MEDICINE | Facility: CLINIC | Age: 53
End: 2023-05-13

## 2023-06-20 ENCOUNTER — APPOINTMENT (OUTPATIENT)
Dept: SURGERY | Facility: CLINIC | Age: 53
End: 2023-06-20
Payer: COMMERCIAL

## 2023-06-20 VITALS
HEART RATE: 66 BPM | DIASTOLIC BLOOD PRESSURE: 85 MMHG | WEIGHT: 270 LBS | BODY MASS INDEX: 37.8 KG/M2 | SYSTOLIC BLOOD PRESSURE: 119 MMHG | HEIGHT: 71 IN

## 2023-06-20 PROCEDURE — 99213 OFFICE O/P EST LOW 20 MIN: CPT | Mod: 25

## 2023-06-20 PROCEDURE — 46221 LIGATION OF HEMORRHOID(S): CPT

## 2023-06-20 RX ORDER — LIDOCAINE 5 G/100G
5 OINTMENT TOPICAL
Qty: 60 | Refills: 1 | Status: ACTIVE | COMMUNITY
Start: 2023-06-20 | End: 1900-01-01

## 2023-06-20 RX ORDER — HYDROCORTISONE 25 MG/G
2.5 CREAM TOPICAL
Qty: 1 | Refills: 1 | Status: ACTIVE | COMMUNITY
Start: 2023-06-20 | End: 1900-01-01

## 2023-06-20 NOTE — HISTORY OF PRESENT ILLNESS
[FreeTextEntry1] : Mr. DAVIS WILEY is a 53 year M with a history of anxiety, GERD, obesity, IBS, heart murmur, iron deficiency (hx of transfusion), presenting with anal bleeding here for a follow up visit. He has occasional anal bleeding sometimes with wiping and in the toilet. He also notices anal irritation when the hemorrhoids prolapse which resolve when they spontaneously reduce. He did banding around 2014 during his colonoscopy which was very painful. He's on daily Metamucil.  I performed his last colonoscopy in 10/2021 which revealed moderate to large internal hemorrhoids. He is not on a blood thinner. Patient reports fainting at work on 1/25/2023 and was taken to [facility]. He is iron deficient anemic and was told to have that worked up. We have been banding his hemorrhoids with him seeing less blood now. He reports about 10days ago a hemorrhoid felt like it popped out and got swollen and very uncomfortable/painful. It is better these last couple of days because he has been using Tucks pads/witch hazel. 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

## 2023-06-20 NOTE — PROCEDURE
[FreeTextEntry1] : Anoscopy w/ rubber band ligation - performed with small lighted disposable anoscope, LP better today, RP enlarged, a band was placed RP with some bleeding which stopped at the end, pt tolerated the procedure.

## 2023-07-18 ENCOUNTER — APPOINTMENT (OUTPATIENT)
Dept: SURGERY | Facility: CLINIC | Age: 53
End: 2023-07-18

## 2023-08-31 ENCOUNTER — APPOINTMENT (OUTPATIENT)
Dept: INTERNAL MEDICINE | Facility: CLINIC | Age: 53
End: 2023-08-31
Payer: COMMERCIAL

## 2023-08-31 VITALS
RESPIRATION RATE: 17 BRPM | SYSTOLIC BLOOD PRESSURE: 134 MMHG | BODY MASS INDEX: 39.2 KG/M2 | WEIGHT: 280 LBS | DIASTOLIC BLOOD PRESSURE: 90 MMHG | TEMPERATURE: 98.4 F | HEART RATE: 68 BPM | OXYGEN SATURATION: 95 % | HEIGHT: 71 IN

## 2023-08-31 DIAGNOSIS — R01.1 CARDIAC MURMUR, UNSPECIFIED: ICD-10-CM

## 2023-08-31 DIAGNOSIS — R55 SYNCOPE AND COLLAPSE: ICD-10-CM

## 2023-08-31 PROCEDURE — 99214 OFFICE O/P EST MOD 30 MIN: CPT | Mod: 25

## 2023-08-31 PROCEDURE — 36415 COLL VENOUS BLD VENIPUNCTURE: CPT

## 2023-08-31 RX ORDER — SEMAGLUTIDE 0.25 MG/.5ML
0.25 INJECTION, SOLUTION SUBCUTANEOUS
Qty: 3 | Refills: 0 | Status: DISCONTINUED | COMMUNITY
Start: 2023-02-18 | End: 2023-08-31

## 2023-08-31 RX ORDER — LIRAGLUTIDE 6 MG/ML
18 INJECTION, SOLUTION SUBCUTANEOUS
Qty: 1 | Refills: 5 | Status: DISCONTINUED | COMMUNITY
Start: 2023-03-11 | End: 2023-08-31

## 2023-08-31 NOTE — ASSESSMENT
[FreeTextEntry1] : Anxiety stable continue current regimen. GERD continue famotidine diet modification discussed with the patient.  Weight loss advised Glucose intolerance check hemoglobin A1c intervention pending results Cardiac murmur follow-up cardiology Iron deficiency anemia secondary to bleeding hemorrhoids has been receiving banding.  Has not had rectal bleeding in a while.  Check labs Near syncope still complaining of dizziness.  Discussed with the patient to consider cardiac monitor with cardiology.  If unremarkable and symptoms persist will consider neurology evaluation.  Check labs to evaluate for iron Abdominal pain right upper quadrant abdominal ultrasound done in ER is unremarkable possibly related to GERD follow-up GI Elevated cholesterol check fasting lipids Obesity has gained 10 pounds.  Insurance denied Saxenda or Wegovy.  Weight loss advised Follow-up pending labs

## 2023-08-31 NOTE — HISTORY OF PRESENT ILLNESS
[FreeTextEntry1] : Follow-up [de-identified] : Patient presents for follow-up prediabetes GERD obesity near syncope anxiety heart murmur.  Patient had near syncopal episode but 6 months ago.  Was found to have low iron and had bleeding hemorrhoids.  Has been under GI care and has been receiving banding for hemorrhoids on a monthly basis.  States that he has not seen blood per rectum for a while now.  However still feels dizziness and presyncopal sensation sometimes imaging head was unremarkable done in ER.  Status post stress test unremarkable Complaining of right upper quadrant abdominal discomfort with some radiation to the back of for more than a year intermittent mild has significant GERD.  Had abdominal ultrasound done in ER which was unremarkable as per patient no nausea vomiting weight loss fever constipation diarrhea

## 2023-09-01 ENCOUNTER — NON-APPOINTMENT (OUTPATIENT)
Age: 53
End: 2023-09-01

## 2023-09-01 ENCOUNTER — APPOINTMENT (OUTPATIENT)
Dept: CARDIOLOGY | Facility: CLINIC | Age: 53
End: 2023-09-01
Payer: COMMERCIAL

## 2023-09-01 VITALS
OXYGEN SATURATION: 97 % | DIASTOLIC BLOOD PRESSURE: 90 MMHG | WEIGHT: 280 LBS | HEART RATE: 66 BPM | BODY MASS INDEX: 39.2 KG/M2 | SYSTOLIC BLOOD PRESSURE: 128 MMHG | HEIGHT: 71 IN

## 2023-09-01 DIAGNOSIS — R06.02 SHORTNESS OF BREATH: ICD-10-CM

## 2023-09-01 DIAGNOSIS — R42 DIZZINESS AND GIDDINESS: ICD-10-CM

## 2023-09-01 PROCEDURE — 99214 OFFICE O/P EST MOD 30 MIN: CPT | Mod: 25

## 2023-09-01 PROCEDURE — 93000 ELECTROCARDIOGRAM COMPLETE: CPT

## 2023-09-01 NOTE — REASON FOR VISIT
[FreeTextEntry1] : The patient is here today for follow-up of intermittent lightheadedness and exertional shortness of breath.  The patient continues to have difficulties with obesity.  He had dropped about 10 pounds but put it back on.  He does describe some ongoing difficulties with musculoskeletal discomfort at the edge of the lower right ribs controlled improved when he lost weight.  I suspect that this is probably just pressure on the ribs from his abdominal fat.  The patient continues to describe episodes of transient lightheadedness which can occur at any time but often when he is walking.  The episodes last for seconds and resolve spontaneously.  There is no clear precipitating cause.  He denies any prolonged episodes.  He also describes occasional exertional shortness of breath but this is also not predictable.  The patient walks about three quarters of a mile to work every day and the episodes generally do not occur.  Patient states extreme importance of weight loss.  We discussed several of the newer weight loss drugs.  The patient will consider trying that for now.

## 2023-09-01 NOTE — REVIEW OF SYSTEMS
[TextEntry] : Except as noted above... Constitutional: The patient denied headache, fatigue, fever, sweats, loss of appetite or chills Eyes: The patient denied double vision, eye pain, eye discharge, red eyes or itchy eyes ENT: The patient denied ear pain, ear discharge, nasal congestion, nasal discharge, sore throat, enlarged tonsils, hoarseness, neck pain or neck swelling Cardiovascular: The patient denied chest pain, chest discomfort, palpitations, fainting  or leg cramps Respiratory: The patient denied  cough, coughing up blood, wheezing, chest congestion or mucous production GI: The patient denied weight gain, weight loss, abdominal pain, nausea, vomiting, diarrhea, constipation, black stools or bloody stools : The patient denied pain on urination, burning with urination, frequent urination or blood in the urine Skin: The patient denied rashes, redness or swelling Neurologic: The patient denied headache, stiff neck, weakness, numbness, difficulty speaking, unsteadiness or numbness/tingling in feet Psychiatric: The patient denied hallucinations, agitation or disorientation Endocrine: The patient denied excessive thirst, excessive urination, cold intolerance or heat intolerance Hematologic: The patient denied easy bruisability or pallor Allergic/Immunologic: The patient denied runny nose, recurrent infections, hives or pruritis Musculoskeletal: The patient denied arthritic pains, muscle weakness or muscle aches Extremities: The patient denied clubbing, cyanosis or lower extremity swelling

## 2023-09-01 NOTE — PHYSICAL EXAM
[TextEntry] : General/Constitutional: WD/ WN in NAD H: NC/AT Eyes:  PERRL, sclerae and conjunctivae normal without jaundice or xanthelasma; ENMT:normal teeth, gums and palate with no petechiae, pallor or cyanosis Neck: w/o JVD, thyromegaly or adenopathy; normal venous contour Respiratory: clear to auscultation, normal respiratory effort with no retractions or use of accessory respiratory muscles Heart: [id number], regular rate, normal S1, S2 without murmurs, rubs, gallops, heaves or thrills Vascular exam: normal carotid upstrokes without carotid or abdominal bruits. 2+/2+ pulses to posterior tibialis and dorsalis pedis Abdomen: soft, nontender, bowels sounds normal without hepatomegaly or splenomegaly, masses or bruits; mild tenderness to palpation at the lower end of the right lower ribs with no masses felt. Musculoskeletal:without significant kyphosis or scoliosis Extremities: w/o CCE, good capillary filling Skin: no stasis changes; no ulcers  Neuro: AA and O x 3; no focal neurologic deficits Psych: normal mood and affect

## 2023-09-01 NOTE — DISCUSSION/SUMMARY
[FreeTextEntry1] : Lightheadedness–as above the episodes are infrequent and very brief, I reassured the patient that at this time there is no cause for concern and that it is unlikely that any work-up will reveal anything significant.  If the episodes begin to occur more frequently or last longer, he will contact me. Exertional shortness of breath–the patient's EKG today is unchanged.  As above, since symptoms are infrequent and self-limited, no further work-up is necessary at this time.  The patient did have a stress test in March that was negative for ischemia.  If symptoms begin to occur with increased frequency or duration or severity, he will contact me.  Otherwise return visit 1 year or sooner if any further problems develop.   Total time of the encounter: 30 minutes which included but was not limited to the following: Face-to-face and non face-to-face time personally spent by the physician preparing to see the patient, obtaining and/or resuming separately obtained history, performing a medically appropriate examination and/or evaluation, counseling and educating the patient/family/caregiver, ordering medications, tests or procedures, referring and communicating with other healthcare professionals, documenting clinical information in the electronic health record, independently interpreting results and communicated results to the patient/family/caregiver and care coordination.

## 2023-09-02 DIAGNOSIS — E74.39 OTHER DISORDERS OF INTESTINAL CARBOHYDRATE ABSORPTION: ICD-10-CM

## 2023-09-02 LAB
ALBUMIN SERPL ELPH-MCNC: 4.4 G/DL
ALP BLD-CCNC: 63 U/L
ALT SERPL-CCNC: 34 U/L
ANION GAP SERPL CALC-SCNC: 14 MMOL/L
AST SERPL-CCNC: 22 U/L
BASOPHILS # BLD AUTO: 0.05 K/UL
BASOPHILS NFR BLD AUTO: 1 %
BILIRUB SERPL-MCNC: 0.2 MG/DL
BUN SERPL-MCNC: 15 MG/DL
CALCIUM SERPL-MCNC: 9.1 MG/DL
CHLORIDE SERPL-SCNC: 106 MMOL/L
CHOLEST SERPL-MCNC: 221 MG/DL
CO2 SERPL-SCNC: 24 MMOL/L
CREAT SERPL-MCNC: 0.8 MG/DL
EGFR: 106 ML/MIN/1.73M2
EOSINOPHIL # BLD AUTO: 0.23 K/UL
EOSINOPHIL NFR BLD AUTO: 4.5 %
ESTIMATED AVERAGE GLUCOSE: 123 MG/DL
GLUCOSE SERPL-MCNC: 102 MG/DL
HBA1C MFR BLD HPLC: 5.9 %
HCT VFR BLD CALC: 51.8 %
HDLC SERPL-MCNC: 55 MG/DL
HGB BLD-MCNC: 15.9 G/DL
IMM GRANULOCYTES NFR BLD AUTO: 0 %
IRON SERPL-MCNC: 59 UG/DL
LDLC SERPL CALC-MCNC: 149 MG/DL
LYMPHOCYTES # BLD AUTO: 1.4 K/UL
LYMPHOCYTES NFR BLD AUTO: 27.2 %
MAN DIFF?: NORMAL
MCHC RBC-ENTMCNC: 27.7 PG
MCHC RBC-ENTMCNC: 30.7 GM/DL
MCV RBC AUTO: 90.2 FL
MONOCYTES # BLD AUTO: 0.51 K/UL
MONOCYTES NFR BLD AUTO: 9.9 %
NEUTROPHILS # BLD AUTO: 2.96 K/UL
NEUTROPHILS NFR BLD AUTO: 57.4 %
NONHDLC SERPL-MCNC: 166 MG/DL
PLATELET # BLD AUTO: 260 K/UL
POTASSIUM SERPL-SCNC: 4.8 MMOL/L
PROT SERPL-MCNC: 7 G/DL
RBC # BLD: 5.74 M/UL
RBC # FLD: 14 %
SODIUM SERPL-SCNC: 143 MMOL/L
TRIGL SERPL-MCNC: 94 MG/DL
WBC # FLD AUTO: 5.15 K/UL

## 2023-09-02 RX ORDER — SEMAGLUTIDE 0.68 MG/ML
2 INJECTION, SOLUTION SUBCUTANEOUS
Qty: 1 | Refills: 3 | Status: ACTIVE | COMMUNITY
Start: 2023-09-02 | End: 1900-01-01

## 2023-11-10 NOTE — PHYSICAL EXAM
[Normal] : was normal [None] : there was no rectal abscess [Normal Rate and Rhythm] : normal rate and rhythm [Alert] : alert [Oriented to Person] : oriented to person [Oriented to Place] : oriented to place [Oriented to Time] : oriented to time [Excoriation] : no perianal excoriation [Tender, Swollen] : tender, swollen [Thrombosed] : that was not thrombosed [Gross Blood] : no gross blood [JVD] : no jugular venous distention  [de-identified] : soft, obese non-distended, non-tender to palpation.  [de-identified] : non-inflamed RA and RL external hemorrhoids that connect internally, LL inflamed external hemorrhoid (no thrombosis). No pain on MONA. Internal Exam below.  [de-identified] : awake, alert, NAD  [de-identified] : normocephalic, atraumatic, EOMI, normal conjunctiva  [de-identified] : b/l chest rise, EWOB on RA  [de-identified] : deferred [de-identified] : Normal strength  [de-identified] : Perianal as above  [de-identified] : normal mood and affect  Render Risk Assessment In Note?: no Detail Level: Simple Comment: Plan to continue on Accutane 90mg daily until the completion of therapy.

## 2023-12-16 ENCOUNTER — APPOINTMENT (OUTPATIENT)
Dept: INTERNAL MEDICINE | Facility: CLINIC | Age: 53
End: 2023-12-16
Payer: COMMERCIAL

## 2023-12-16 VITALS
HEIGHT: 71 IN | HEART RATE: 78 BPM | OXYGEN SATURATION: 98 % | TEMPERATURE: 97.5 F | SYSTOLIC BLOOD PRESSURE: 122 MMHG | DIASTOLIC BLOOD PRESSURE: 80 MMHG | RESPIRATION RATE: 17 BRPM

## 2023-12-16 DIAGNOSIS — K21.9 GASTRO-ESOPHAGEAL REFLUX DISEASE W/OUT ESOPHAGITIS: ICD-10-CM

## 2023-12-16 DIAGNOSIS — K58.9 IRRITABLE BOWEL SYNDROME W/OUT DIARRHEA: ICD-10-CM

## 2023-12-16 DIAGNOSIS — D50.9 IRON DEFICIENCY ANEMIA, UNSPECIFIED: ICD-10-CM

## 2023-12-16 DIAGNOSIS — E78.00 PURE HYPERCHOLESTEROLEMIA, UNSPECIFIED: ICD-10-CM

## 2023-12-16 DIAGNOSIS — R10.11 RIGHT UPPER QUADRANT PAIN: ICD-10-CM

## 2023-12-16 DIAGNOSIS — F41.9 ANXIETY DISORDER, UNSPECIFIED: ICD-10-CM

## 2023-12-16 DIAGNOSIS — E66.9 OBESITY, UNSPECIFIED: ICD-10-CM

## 2023-12-16 DIAGNOSIS — K76.0 FATTY (CHANGE OF) LIVER, NOT ELSEWHERE CLASSIFIED: ICD-10-CM

## 2023-12-16 DIAGNOSIS — R73.03 PREDIABETES.: ICD-10-CM

## 2023-12-16 PROCEDURE — 36415 COLL VENOUS BLD VENIPUNCTURE: CPT

## 2023-12-16 PROCEDURE — 99214 OFFICE O/P EST MOD 30 MIN: CPT | Mod: 25

## 2023-12-16 NOTE — HISTORY OF PRESENT ILLNESS
[FreeTextEntry1] : Follow-up [de-identified] : Patient presents for follow-up hyperlipidemia prediabetes obesity GERD upper abdominal pain.  Status post abdominal ultrasound no gallbladder disease revealed hepatic steatosis Patient states that upper abdominal discomfort has been better with over-the-counter Pepcid

## 2023-12-16 NOTE — ASSESSMENT
[FreeTextEntry1] : Fatty liver discussed with the patient the diagnosis and prognosis.  Discussed with the patient that the recommended treatment is weight loss at least 10% of current body weight.  Discussed with the patient if left untreated could progress to fibrosis and cirrhosis.  Check Hawkins panel  abdominal pain upper abdomen no gallbladder stones identified pain improved likely secondary to GERD Anxiety stable Elevated cholesterol check fasting lipids intervention pending results GERD stable IBS stable Iron deficiency anemia resolved Prediabetes check hemoglobin A1c intervention pending results .  Obesity weight loss discussed with the patient Follow-up 3 months/pending results

## 2023-12-21 LAB
ALBUMIN SERPL ELPH-MCNC: 4.6 G/DL
ALP BLD-CCNC: 60 U/L
ALT SERPL-CCNC: 37 U/L
ANION GAP SERPL CALC-SCNC: 13 MMOL/L
AST SERPL W P-5'-P-CCNC: 21 IU/L
AST SERPL-CCNC: 19 U/L
BILIRUB SERPL-MCNC: 0.4 MG/DL
BUN SERPL-MCNC: 12 MG/DL
CALCIUM SERPL-MCNC: 9.4 MG/DL
CHLORIDE SERPL-SCNC: 106 MMOL/L
CHOLEST SERPL-MCNC: 203 MG/DL
CHOLEST SERPL-MCNC: 208 MG/DL
CO2 SERPL-SCNC: 24 MMOL/L
COMMENT:: NORMAL
CREAT SERPL-MCNC: 0.77 MG/DL
EGFR: 107 ML/MIN/1.73M2
ESTIMATED AVERAGE GLUCOSE: 117 MG/DL
FIBROSIS STAGE SERPL QL: NORMAL
FIBROSURE ALPHA 2 MACROGLOBULINS: 251 MG/DL
FIBROSURE ALT (SGPT): 42 IU/L
FIBROSURE APOLIPOPROTEIN A1: 136 MG/DL
FIBROSURE GGT: 30 IU/L
FIBROSURE HAPTOGLOBIN: 165 MG/DL
FIBROSURE SCORING: NORMAL
FIBROSURE TOTAL BILIRUBIN: 0.2 MG/DL
GLUCOSE SERPL-MCNC: 93 MG/DL
GLUCOSE SERPL-MCNC: 93 MG/DL
HBA1C MFR BLD HPLC: 5.7 %
HDLC SERPL-MCNC: 55 MG/DL
INTERPRETATIONS:: NORMAL
LDLC SERPL CALC-MCNC: 132 MG/DL
LIVER FIBR SCORE SERPL CALC.FIBROSURE: 0.19
Lab: NORMAL
NASH SCORING: NORMAL
NECROINFLAMMATORY ACT GRADE SERPL QL: NORMAL
NECROINFLAMMATORY ACT SCORE SERPL: 0.35
NONHDLC SERPL-MCNC: 153 MG/DL
POTASSIUM SERPL-SCNC: 4.7 MMOL/L
PROT SERPL-MCNC: 7.3 G/DL
SERVICE CMNT-IMP: NORMAL
SODIUM SERPL-SCNC: 142 MMOL/L
STEATOSIS GRADE: NORMAL
STEATOSIS SCORE: 0.42
STEATOSIS SCORING: NORMAL
TRIGL SERPL-MCNC: 120 MG/DL
TRIGL SERPL-MCNC: 127 MG/DL

## 2024-01-16 ENCOUNTER — APPOINTMENT (OUTPATIENT)
Dept: SURGERY | Facility: CLINIC | Age: 54
End: 2024-01-16

## 2024-01-19 ENCOUNTER — TRANSCRIPTION ENCOUNTER (OUTPATIENT)
Age: 54
End: 2024-01-19

## 2025-02-27 ENCOUNTER — NON-APPOINTMENT (OUTPATIENT)
Age: 55
End: 2025-02-27

## 2025-02-27 ENCOUNTER — APPOINTMENT (OUTPATIENT)
Dept: INTERNAL MEDICINE | Facility: CLINIC | Age: 55
End: 2025-02-27
Payer: COMMERCIAL

## 2025-02-27 VITALS
RESPIRATION RATE: 18 BRPM | DIASTOLIC BLOOD PRESSURE: 80 MMHG | OXYGEN SATURATION: 95 % | TEMPERATURE: 97.5 F | BODY MASS INDEX: 38.64 KG/M2 | HEART RATE: 68 BPM | WEIGHT: 276 LBS | HEIGHT: 71 IN | SYSTOLIC BLOOD PRESSURE: 122 MMHG

## 2025-02-27 DIAGNOSIS — Q23.0 CONGENITAL STENOSIS OF AORTIC VALVE: ICD-10-CM

## 2025-02-27 DIAGNOSIS — R73.03 PREDIABETES.: ICD-10-CM

## 2025-02-27 DIAGNOSIS — E66.9 OBESITY, UNSPECIFIED: ICD-10-CM

## 2025-02-27 DIAGNOSIS — K76.0 FATTY (CHANGE OF) LIVER, NOT ELSEWHERE CLASSIFIED: ICD-10-CM

## 2025-02-27 DIAGNOSIS — G47.33 OBSTRUCTIVE SLEEP APNEA (ADULT) (PEDIATRIC): ICD-10-CM

## 2025-02-27 DIAGNOSIS — D50.9 IRON DEFICIENCY ANEMIA, UNSPECIFIED: ICD-10-CM

## 2025-02-27 DIAGNOSIS — E78.00 PURE HYPERCHOLESTEROLEMIA, UNSPECIFIED: ICD-10-CM

## 2025-02-27 DIAGNOSIS — Z00.00 ENCOUNTER FOR GENERAL ADULT MEDICAL EXAMINATION W/OUT ABNORMAL FINDINGS: ICD-10-CM

## 2025-02-27 DIAGNOSIS — F41.9 ANXIETY DISORDER, UNSPECIFIED: ICD-10-CM

## 2025-02-27 DIAGNOSIS — K21.9 GASTRO-ESOPHAGEAL REFLUX DISEASE W/OUT ESOPHAGITIS: ICD-10-CM

## 2025-02-27 PROCEDURE — 93000 ELECTROCARDIOGRAM COMPLETE: CPT | Mod: 59

## 2025-02-27 PROCEDURE — 36415 COLL VENOUS BLD VENIPUNCTURE: CPT

## 2025-02-27 PROCEDURE — G0444 DEPRESSION SCREEN ANNUAL: CPT | Mod: 59

## 2025-02-27 PROCEDURE — 99214 OFFICE O/P EST MOD 30 MIN: CPT | Mod: 25

## 2025-02-27 PROCEDURE — 99396 PREV VISIT EST AGE 40-64: CPT

## 2025-02-27 RX ORDER — TIRZEPATIDE 2.5 MG/.5ML
2.5 INJECTION, SOLUTION SUBCUTANEOUS
Qty: 1 | Refills: 5 | Status: ACTIVE | COMMUNITY
Start: 2025-02-27 | End: 1900-01-01

## 2025-02-28 LAB
25(OH)D3 SERPL-MCNC: 44.3 NG/ML
ALBUMIN SERPL ELPH-MCNC: 4.6 G/DL
ALP BLD-CCNC: 69 U/L
ALT SERPL-CCNC: 48 U/L
ANION GAP SERPL CALC-SCNC: 14 MMOL/L
APPEARANCE: CLEAR
AST SERPL-CCNC: 22 U/L
BASOPHILS # BLD AUTO: 0.05 K/UL
BASOPHILS NFR BLD AUTO: 0.9 %
BILIRUB SERPL-MCNC: 0.3 MG/DL
BILIRUBIN URINE: NEGATIVE
BLOOD URINE: NEGATIVE
BUN SERPL-MCNC: 14 MG/DL
CALCIUM SERPL-MCNC: 9.8 MG/DL
CHLORIDE SERPL-SCNC: 105 MMOL/L
CHOLEST SERPL-MCNC: 229 MG/DL
CO2 SERPL-SCNC: 24 MMOL/L
COLOR: YELLOW
CREAT SERPL-MCNC: 0.84 MG/DL
EGFR: 104 ML/MIN/1.73M2
EOSINOPHIL # BLD AUTO: 0.18 K/UL
EOSINOPHIL NFR BLD AUTO: 3.2 %
ESTIMATED AVERAGE GLUCOSE: 117 MG/DL
GLUCOSE QUALITATIVE U: NEGATIVE MG/DL
GLUCOSE SERPL-MCNC: 102 MG/DL
HBA1C MFR BLD HPLC: 5.7 %
HCT VFR BLD CALC: 54.3 %
HDLC SERPL-MCNC: 53 MG/DL
HGB BLD-MCNC: 16.9 G/DL
IMM GRANULOCYTES NFR BLD AUTO: 0.2 %
INSULIN P FAST SERPL-ACNC: 18.7 UU/ML
KETONES URINE: NEGATIVE MG/DL
LDLC SERPL CALC-MCNC: 157 MG/DL
LEUKOCYTE ESTERASE URINE: NEGATIVE
LYMPHOCYTES # BLD AUTO: 1.56 K/UL
LYMPHOCYTES NFR BLD AUTO: 27.8 %
MAN DIFF?: NORMAL
MCHC RBC-ENTMCNC: 28.9 PG
MCHC RBC-ENTMCNC: 31.1 G/DL
MCV RBC AUTO: 92.8 FL
MONOCYTES # BLD AUTO: 0.45 K/UL
MONOCYTES NFR BLD AUTO: 8 %
NEUTROPHILS # BLD AUTO: 3.36 K/UL
NEUTROPHILS NFR BLD AUTO: 59.9 %
NITRITE URINE: NEGATIVE
NONHDLC SERPL-MCNC: 176 MG/DL
PH URINE: 5.5
PLATELET # BLD AUTO: 302 K/UL
POTASSIUM SERPL-SCNC: 4.9 MMOL/L
PROT SERPL-MCNC: 7.5 G/DL
PROTEIN URINE: NEGATIVE MG/DL
PSA FREE FLD-MCNC: 61 %
PSA FREE SERPL-MCNC: 0.19 NG/ML
PSA SERPL-MCNC: 0.32 NG/ML
RBC # BLD: 5.85 M/UL
RBC # FLD: 13.7 %
SODIUM SERPL-SCNC: 143 MMOL/L
SPECIFIC GRAVITY URINE: 1.02
TRIGL SERPL-MCNC: 108 MG/DL
TSH SERPL-ACNC: 1.02 UIU/ML
UROBILINOGEN URINE: 0.2 MG/DL
VIT B12 SERPL-MCNC: 433 PG/ML
WBC # FLD AUTO: 5.61 K/UL

## 2025-03-04 LAB
AST SERPL W P-5'-P-CCNC: 29 IU/L
CHOLEST SERPL-MCNC: 228 MG/DL
COMMENT:: NORMAL
FIBROSIS STAGE SERPL QL: NORMAL
FIBROSURE ALPHA 2 MACROGLOBULINS: 275 MG/DL
FIBROSURE ALT (SGPT): 53 IU/L
FIBROSURE APOLIPOPROTEIN A1: 145 MG/DL
FIBROSURE GGT: 28 IU/L
FIBROSURE HAPTOGLOBIN: 156 MG/DL
FIBROSURE SCORING: NORMAL
FIBROSURE TOTAL BILIRUBIN: 0.2 MG/DL
GLUCOSE SERPL-MCNC: 100 MG/DL
INTERPRETATIONS:: NORMAL
LIVER FIBR SCORE SERPL CALC.FIBROSURE: 0.21
Lab: NORMAL
NASH SCORING: NORMAL
NECROINFLAMMATORY ACT GRADE SERPL QL: NORMAL
NECROINFLAMMATORY ACT SCORE SERPL: 0.44
SERVICE CMNT-IMP: NORMAL
STEATOSIS GRADE: NORMAL
STEATOSIS SCORE: 0.4
STEATOSIS SCORING: NORMAL
TRIGL SERPL-MCNC: 110 MG/DL